# Patient Record
Sex: FEMALE | Race: WHITE | Employment: OTHER | URBAN - METROPOLITAN AREA
[De-identification: names, ages, dates, MRNs, and addresses within clinical notes are randomized per-mention and may not be internally consistent; named-entity substitution may affect disease eponyms.]

---

## 2019-02-06 ENCOUNTER — HOSPITAL ENCOUNTER (OUTPATIENT)
Dept: GENERAL RADIOLOGY | Age: 54
Discharge: HOME | End: 2019-02-08
Payer: COMMERCIAL

## 2019-02-06 DIAGNOSIS — M25.562 LEFT KNEE PAIN, UNSPECIFIED CHRONICITY: ICD-10-CM

## 2019-02-06 PROCEDURE — 73564 X-RAY EXAM KNEE 4 OR MORE: CPT

## 2019-04-08 NOTE — H&P
Normoactive bowel sounds x4  quadrants. EXTREMITIES:  Left knee:  Positive Omer. Current range of motion is  0 to 100 degrees. NEURO:  CN II through XII grossly intact. ASSESSMENT:  Left knee medial meniscal tear. PLAN:  Left knee arthroscopy, partial medial meniscectomy. This will be  on 04/12/2019.       Terra Celestin    D: 04/08/2019 13:02:31       T: 04/08/2019 13:25:10     [email or web address]  Job#: [id number]     Doc#: [id number]    CC:

## 2019-04-11 ENCOUNTER — ANESTHESIA EVENT (OUTPATIENT)
Dept: OPERATING ROOM | Age: 54
End: 2019-04-11
Payer: COMMERCIAL

## 2019-04-11 NOTE — ANESTHESIA PRE PROCEDURE
Department of Anesthesiology  Preprocedure Note       Name:  Aidan Cazares   Age:  48 y.o.  :  1965                                          MRN:  [id number]         Date:  2019      Surgeon: Damian Hogue):  Allison Ha MD    Procedure: LEFT KNEE ARTHROSCOPY KNEE MEDIAL MENISCECTOMY, SUPINE, (OUTSIDE PAT AT DR Eun Roberson) TO BE LATEX FREE (Left Knee)    Medications prior to admission:   Prior to Admission medications    Medication Sig Start Date End Date Taking? Authorizing Provider   risperiDONE (RISPERDAL) 0.25 MG tablet Take 0.25 mg by mouth 2 times daily   Yes Historical Provider, MD   clonazepam (KLONOPIN) Take 0.005 mg/kg by mouth 3 times daily. Yes Historical Provider, MD   traMADol (ULTRAM) 50 MG tablet Take 50 mg by mouth every 6 hours as needed for Pain. Yes Historical Provider, MD   Dulaglutide (TRULICITY) 6.61 DR/7.5OG SOPN Inject into the skin   Yes Historical Provider, MD   albuterol (ACCUNEB) 0.63 MG/3ML nebulizer solution Take 1 ampule by nebulization every 6 hours as needed for Wheezing   Yes Historical Provider, MD   ALBUTEROL IN Inhale into the lungs   Yes Historical Provider, MD       Current medications:    Current Facility-Administered Medications   Medication Dose Route Frequency Provider Last Rate Last Dose    ceFAZolin (ANCEF) 2 g in dextrose 3 % 50 mL IVPB (duplex)  2 g Intravenous On Call to [address] Larnell Homans, MD        lactated ringers infusion   Intravenous Continuous Ami Plan, APRN - CNP        sodium chloride flush 0.9 % injection 10 mL  10 mL Intravenous 2 times per day Ami Plan, APRN - CNP        sodium chloride flush 0.9 % injection 10 mL  10 mL Intravenous PRN Ami Plan, APRN - CNP        lidocaine PF 1 % injection 1 mL  1 mL Intradermal Once PRN Ami Plan, APRN - CNP        lactated ringers infusion                Allergies:     Allergies   Allergen Reactions    Moxifloxacin Rash       Problem List:  There is no problem list on file for this patient. Past Medical History:        Diagnosis Date    Arthritis     CAD (coronary artery disease)     COPD (chronic obstructive pulmonary disease) [facility] [address]     Diabetes mellitus ([facility]     History of blood transfusion        Past Surgical History:        Procedure Laterality Date     SECTION      COLONOSCOPY      KNEE SURGERY         Social History:    Social History     Tobacco Use    Smoking status: Current Every Day Smoker     Packs/day: 1.00    Smokeless tobacco: Never Used    Tobacco comment: is stopping today   Substance Use Topics    Alcohol use: Not Currently                                Ready to quit: Not Answered  Counseling given: Not Answered  Comment: is stopping today      Vital Signs (Current):   Vitals:    19 0624   BP: 87/72   Pulse: 81   Resp: 16   Temp: 97.4 °F (36.3 °C)   TempSrc: Temporal   SpO2: 96%   Weight: 260 lb (117.9 kg)   Height: 5' 9\" (1.753 m)                                              BP Readings from Last 3 Encounters:   19 87/72       NPO Status: Time of last liquid consumption:                         Time of last solid consumption:                         Date of last liquid consumption: 19                        Date of last solid food consumption: 19    BMI:   Wt Readings from Last 3 Encounters:   19 260 lb (117.9 kg)     Body mass index is 38.4 kg/m².     CBC:   Lab Results   Component Value Date    WBC 7.8 2015    RBC 4.61 2015    HGB 15.4 2015    HCT 44.9 2015    MCV 97.3 2015    RDW 12.9 2015     2015       CMP:   Lab Results   Component Value Date     2015    K 4.2 2015    CL 98 2015    CO2 26 2015    BUN 19 2015    CREATININE 0.99 2015    GFRAA >60.0 2015    LABGLOM 59.9 2015    GLUCOSE 99 2015    PROT 6.6 2015    CALCIUM 9.1 2015    BILITOT 0.2 2015    ALKPHOS 78 05/04/2015    AST 20 05/04/2015    ALT 27 05/04/2015       POC Tests: No results for input(s): POCGLU, POCNA, POCK, POCCL, POCBUN, POCHEMO, POCHCT in the last 72 hours. Coags:   Lab Results   Component Value Date    PROTIME 9.7 05/04/2015    INR 1.0 05/04/2015    APTT 26.8 05/04/2015       HCG (If Applicable):   Lab Results   Component Value Date    PREGTESTUR Negative 04/19/2016        ABGs: No results found for: PHART, PO2ART, DGN2HPP, VDI8MJY, BEART, Q5BLHOTQ     Type & Screen (If Applicable):  No results found for: LABABO, [address]    Anesthesia Evaluation  Patient summary reviewed and Nursing notes reviewed no history of anesthetic complications:   Airway: Mallampati: II  TM distance: >3 FB   Neck ROM: full  Mouth opening: > = 3 FB Dental: normal exam         Pulmonary:normal exam  breath sounds clear to auscultation  (+) COPD:  current smoker          Patient did not smoke on day of surgery. Cardiovascular:  Exercise tolerance: good (>4 METS),   (+) CAD:,       ECG reviewed  Rhythm: regular  Rate: normal           Beta Blocker:  Not on Beta Blocker         Neuro/Psych:   (+) psychiatric history:depression/anxiety             GI/Hepatic/Renal: Neg GI/Hepatic/Renal ROS            Endo/Other: Negative Endo/Other ROS   (+) Diabetes, . Pt had PAT visit. Abdominal:           Vascular: negative vascular ROS. Anesthesia Plan      general     ASA 3     (LMA)  Induction: intravenous. MIPS: Postoperative opioids intended and Prophylactic antiemetics administered. Anesthetic plan and risks discussed with patient. Plan discussed with CRNA.     Attending anesthesiologist reviewed and agrees with Peterson Herrera DO   4/12/2019

## 2019-04-12 ENCOUNTER — ANESTHESIA (OUTPATIENT)
Dept: OPERATING ROOM | Age: 54
End: 2019-04-12
Payer: COMMERCIAL

## 2019-04-12 ENCOUNTER — HOSPITAL ENCOUNTER (OUTPATIENT)
Age: 54
Setting detail: OUTPATIENT SURGERY
Discharge: HOME | End: 2019-04-12
Attending: ORTHOPAEDIC SURGERY | Admitting: ORTHOPAEDIC SURGERY
Payer: COMMERCIAL

## 2019-04-12 VITALS
HEIGHT: 69 IN | RESPIRATION RATE: 16 BRPM | DIASTOLIC BLOOD PRESSURE: 73 MMHG | HEART RATE: 80 BPM | TEMPERATURE: 97 F | BODY MASS INDEX: 38.51 KG/M2 | SYSTOLIC BLOOD PRESSURE: 137 MMHG | WEIGHT: 260 LBS | OXYGEN SATURATION: 93 %

## 2019-04-12 VITALS — SYSTOLIC BLOOD PRESSURE: 85 MMHG | TEMPERATURE: 96.6 F | OXYGEN SATURATION: 82 % | DIASTOLIC BLOOD PRESSURE: 53 MMHG

## 2019-04-12 LAB
GLUCOSE BLD-MCNC: 120 MG/DL (ref 60–115)
GLUCOSE BLD-MCNC: 122 MG/DL (ref 60–115)
PERFORMED ON: ABNORMAL
PERFORMED ON: ABNORMAL

## 2019-04-12 PROCEDURE — 3700000000 HC ANESTHESIA ATTENDED CARE: Performed by: ORTHOPAEDIC SURGERY

## 2019-04-12 PROCEDURE — 2580000003 HC RX 258: Performed by: ORTHOPAEDIC SURGERY

## 2019-04-12 PROCEDURE — 7100000011 HC PHASE II RECOVERY - ADDTL 15 MIN: Performed by: ORTHOPAEDIC SURGERY

## 2019-04-12 PROCEDURE — 2709999900 HC NON-CHARGEABLE SUPPLY: Performed by: ORTHOPAEDIC SURGERY

## 2019-04-12 PROCEDURE — 3600000003 HC SURGERY LEVEL 3 BASE: Performed by: ORTHOPAEDIC SURGERY

## 2019-04-12 PROCEDURE — 3600000013 HC SURGERY LEVEL 3 ADDTL 15MIN: Performed by: ORTHOPAEDIC SURGERY

## 2019-04-12 PROCEDURE — 6360000002 HC RX W HCPCS: Performed by: STUDENT IN AN ORGANIZED HEALTH CARE EDUCATION/TRAINING PROGRAM

## 2019-04-12 PROCEDURE — 2580000003 HC RX 258

## 2019-04-12 PROCEDURE — 2500000003 HC RX 250 WO HCPCS: Performed by: ORTHOPAEDIC SURGERY

## 2019-04-12 PROCEDURE — 7100000010 HC PHASE II RECOVERY - FIRST 15 MIN: Performed by: ORTHOPAEDIC SURGERY

## 2019-04-12 PROCEDURE — 7100000001 HC PACU RECOVERY - ADDTL 15 MIN: Performed by: ORTHOPAEDIC SURGERY

## 2019-04-12 PROCEDURE — 7100000000 HC PACU RECOVERY - FIRST 15 MIN: Performed by: ORTHOPAEDIC SURGERY

## 2019-04-12 PROCEDURE — 6360000002 HC RX W HCPCS: Performed by: ORTHOPAEDIC SURGERY

## 2019-04-12 PROCEDURE — 3700000001 HC ADD 15 MINUTES (ANESTHESIA): Performed by: ORTHOPAEDIC SURGERY

## 2019-04-12 PROCEDURE — 6360000002 HC RX W HCPCS: Performed by: NURSE ANESTHETIST, CERTIFIED REGISTERED

## 2019-04-12 RX ORDER — TRAMADOL HYDROCHLORIDE 50 MG/1
50 TABLET ORAL EVERY 6 HOURS PRN
COMMUNITY

## 2019-04-12 RX ORDER — SODIUM CHLORIDE, SODIUM LACTATE, POTASSIUM CHLORIDE, CALCIUM CHLORIDE 600; 310; 30; 20 MG/100ML; MG/100ML; MG/100ML; MG/100ML
INJECTION, SOLUTION INTRAVENOUS
Status: COMPLETED
Start: 2019-04-12 | End: 2019-04-12

## 2019-04-12 RX ORDER — ONDANSETRON 2 MG/ML
4 INJECTION INTRAMUSCULAR; INTRAVENOUS EVERY 6 HOURS PRN
Status: DISCONTINUED | OUTPATIENT
Start: 2019-04-12 | End: 2019-04-12 | Stop reason: HOSPADM

## 2019-04-12 RX ORDER — MORPHINE SULFATE 4 MG/ML
4 INJECTION, SOLUTION INTRAMUSCULAR; INTRAVENOUS
Status: DISCONTINUED | OUTPATIENT
Start: 2019-04-12 | End: 2019-04-12 | Stop reason: HOSPADM

## 2019-04-12 RX ORDER — SODIUM CHLORIDE, SODIUM LACTATE, POTASSIUM CHLORIDE, CALCIUM CHLORIDE 600; 310; 30; 20 MG/100ML; MG/100ML; MG/100ML; MG/100ML
INJECTION, SOLUTION INTRAVENOUS CONTINUOUS
Status: DISCONTINUED | OUTPATIENT
Start: 2019-04-12 | End: 2019-04-12 | Stop reason: HOSPADM

## 2019-04-12 RX ORDER — ONDANSETRON 2 MG/ML
INJECTION INTRAMUSCULAR; INTRAVENOUS PRN
Status: DISCONTINUED | OUTPATIENT
Start: 2019-04-12 | End: 2019-04-12 | Stop reason: SDUPTHER

## 2019-04-12 RX ORDER — HYDROCODONE BITARTRATE AND ACETAMINOPHEN 5; 325 MG/1; MG/1
1 TABLET ORAL PRN
Status: DISCONTINUED | OUTPATIENT
Start: 2019-04-12 | End: 2019-04-12 | Stop reason: HOSPADM

## 2019-04-12 RX ORDER — PROPOFOL 10 MG/ML
INJECTION, EMULSION INTRAVENOUS PRN
Status: DISCONTINUED | OUTPATIENT
Start: 2019-04-12 | End: 2019-04-12 | Stop reason: SDUPTHER

## 2019-04-12 RX ORDER — HYDROMORPHONE HCL 110MG/55ML
0.5 PATIENT CONTROLLED ANALGESIA SYRINGE INTRAVENOUS EVERY 10 MIN PRN
Status: DISCONTINUED | OUTPATIENT
Start: 2019-04-12 | End: 2019-04-12 | Stop reason: HOSPADM

## 2019-04-12 RX ORDER — SODIUM CHLORIDE 0.9 % (FLUSH) 0.9 %
10 SYRINGE (ML) INJECTION EVERY 12 HOURS SCHEDULED
Status: DISCONTINUED | OUTPATIENT
Start: 2019-04-12 | End: 2019-04-12 | Stop reason: HOSPADM

## 2019-04-12 RX ORDER — METOCLOPRAMIDE HYDROCHLORIDE 5 MG/ML
10 INJECTION INTRAMUSCULAR; INTRAVENOUS
Status: DISCONTINUED | OUTPATIENT
Start: 2019-04-12 | End: 2019-04-12 | Stop reason: HOSPADM

## 2019-04-12 RX ORDER — LIDOCAINE HYDROCHLORIDE 10 MG/ML
1 INJECTION, SOLUTION EPIDURAL; INFILTRATION; INTRACAUDAL; PERINEURAL
Status: DISCONTINUED | OUTPATIENT
Start: 2019-04-12 | End: 2019-04-12 | Stop reason: HOSPADM

## 2019-04-12 RX ORDER — SODIUM CHLORIDE 0.9 % (FLUSH) 0.9 %
10 SYRINGE (ML) INJECTION PRN
Status: DISCONTINUED | OUTPATIENT
Start: 2019-04-12 | End: 2019-04-12 | Stop reason: HOSPADM

## 2019-04-12 RX ORDER — ONDANSETRON 2 MG/ML
4 INJECTION INTRAMUSCULAR; INTRAVENOUS
Status: DISCONTINUED | OUTPATIENT
Start: 2019-04-12 | End: 2019-04-12 | Stop reason: HOSPADM

## 2019-04-12 RX ORDER — OXYCODONE HYDROCHLORIDE AND ACETAMINOPHEN 5; 325 MG/1; MG/1
2 TABLET ORAL EVERY 4 HOURS PRN
Status: DISCONTINUED | OUTPATIENT
Start: 2019-04-12 | End: 2019-04-12 | Stop reason: HOSPADM

## 2019-04-12 RX ORDER — RISPERIDONE 0.25 MG/1
0.25 TABLET, FILM COATED ORAL 2 TIMES DAILY
COMMUNITY

## 2019-04-12 RX ORDER — HYDROCODONE BITARTRATE AND ACETAMINOPHEN 5; 325 MG/1; MG/1
2 TABLET ORAL PRN
Status: DISCONTINUED | OUTPATIENT
Start: 2019-04-12 | End: 2019-04-12 | Stop reason: HOSPADM

## 2019-04-12 RX ORDER — MAGNESIUM HYDROXIDE 1200 MG/15ML
LIQUID ORAL CONTINUOUS PRN
Status: COMPLETED | OUTPATIENT
Start: 2019-04-12 | End: 2019-04-12

## 2019-04-12 RX ORDER — MIDAZOLAM HYDROCHLORIDE 1 MG/ML
INJECTION INTRAMUSCULAR; INTRAVENOUS PRN
Status: DISCONTINUED | OUTPATIENT
Start: 2019-04-12 | End: 2019-04-12 | Stop reason: SDUPTHER

## 2019-04-12 RX ORDER — DOCUSATE SODIUM 100 MG/1
100 CAPSULE, LIQUID FILLED ORAL 2 TIMES DAILY
Status: DISCONTINUED | OUTPATIENT
Start: 2019-04-12 | End: 2019-04-12 | Stop reason: HOSPADM

## 2019-04-12 RX ORDER — OXYCODONE HYDROCHLORIDE AND ACETAMINOPHEN 5; 325 MG/1; MG/1
1 TABLET ORAL EVERY 4 HOURS PRN
Status: DISCONTINUED | OUTPATIENT
Start: 2019-04-12 | End: 2019-04-12 | Stop reason: HOSPADM

## 2019-04-12 RX ORDER — ALBUTEROL SULFATE 0.63 MG/3ML
1 SOLUTION RESPIRATORY (INHALATION) EVERY 6 HOURS PRN
COMMUNITY

## 2019-04-12 RX ORDER — BUPIVACAINE HYDROCHLORIDE 5 MG/ML
INJECTION, SOLUTION EPIDURAL; INTRACAUDAL PRN
Status: DISCONTINUED | OUTPATIENT
Start: 2019-04-12 | End: 2019-04-12 | Stop reason: ALTCHOICE

## 2019-04-12 RX ORDER — DIPHENHYDRAMINE HYDROCHLORIDE 50 MG/ML
12.5 INJECTION INTRAMUSCULAR; INTRAVENOUS
Status: DISCONTINUED | OUTPATIENT
Start: 2019-04-12 | End: 2019-04-12 | Stop reason: HOSPADM

## 2019-04-12 RX ORDER — FENTANYL CITRATE 50 UG/ML
50 INJECTION, SOLUTION INTRAMUSCULAR; INTRAVENOUS EVERY 10 MIN PRN
Status: DISCONTINUED | OUTPATIENT
Start: 2019-04-12 | End: 2019-04-12 | Stop reason: HOSPADM

## 2019-04-12 RX ORDER — CEFAZOLIN SODIUM 2 G/50ML
2 SOLUTION INTRAVENOUS
Status: COMPLETED | OUTPATIENT
Start: 2019-04-12 | End: 2019-04-12

## 2019-04-12 RX ORDER — MORPHINE SULFATE 2 MG/ML
2 INJECTION, SOLUTION INTRAMUSCULAR; INTRAVENOUS
Status: DISCONTINUED | OUTPATIENT
Start: 2019-04-12 | End: 2019-04-12 | Stop reason: HOSPADM

## 2019-04-12 RX ORDER — MEPERIDINE HYDROCHLORIDE 50 MG/ML
12.5 INJECTION INTRAMUSCULAR; INTRAVENOUS; SUBCUTANEOUS EVERY 5 MIN PRN
Status: DISCONTINUED | OUTPATIENT
Start: 2019-04-12 | End: 2019-04-12 | Stop reason: HOSPADM

## 2019-04-12 RX ORDER — DEXAMETHASONE SODIUM PHOSPHATE 10 MG/ML
INJECTION INTRAMUSCULAR; INTRAVENOUS PRN
Status: DISCONTINUED | OUTPATIENT
Start: 2019-04-12 | End: 2019-04-12 | Stop reason: SDUPTHER

## 2019-04-12 RX ADMIN — PROPOFOL 200 MG: 10 INJECTION, EMULSION INTRAVENOUS at 07:30

## 2019-04-12 RX ADMIN — FENTANYL CITRATE 50 MCG: 50 INJECTION, SOLUTION INTRAMUSCULAR; INTRAVENOUS at 07:35

## 2019-04-12 RX ADMIN — SODIUM CHLORIDE, POTASSIUM CHLORIDE, SODIUM LACTATE AND CALCIUM CHLORIDE: 600; 310; 30; 20 INJECTION, SOLUTION INTRAVENOUS at 07:12

## 2019-04-12 RX ADMIN — MIDAZOLAM HYDROCHLORIDE 2 MG: 2 INJECTION, SOLUTION INTRAMUSCULAR; INTRAVENOUS at 07:26

## 2019-04-12 RX ADMIN — CEFAZOLIN SODIUM 2 G: 2 SOLUTION INTRAVENOUS at 07:39

## 2019-04-12 RX ADMIN — ONDANSETRON 4 MG: 2 INJECTION INTRAMUSCULAR; INTRAVENOUS at 07:30

## 2019-04-12 RX ADMIN — SODIUM CHLORIDE, SODIUM LACTATE, POTASSIUM CHLORIDE, CALCIUM CHLORIDE: 600; 310; 30; 20 INJECTION, SOLUTION INTRAVENOUS at 07:12

## 2019-04-12 RX ADMIN — FENTANYL CITRATE 25 MCG: 50 INJECTION, SOLUTION INTRAMUSCULAR; INTRAVENOUS at 07:52

## 2019-04-12 RX ADMIN — FENTANYL CITRATE 25 MCG: 50 INJECTION, SOLUTION INTRAMUSCULAR; INTRAVENOUS at 07:47

## 2019-04-12 RX ADMIN — DEXAMETHASONE SODIUM PHOSPHATE 10 MG: 10 INJECTION INTRAMUSCULAR; INTRAVENOUS at 07:30

## 2019-04-12 ASSESSMENT — PULMONARY FUNCTION TESTS
PIF_VALUE: 13
PIF_VALUE: 1
PIF_VALUE: 19
PIF_VALUE: 13
PIF_VALUE: 4
PIF_VALUE: 4
PIF_VALUE: 6
PIF_VALUE: 3
PIF_VALUE: 18
PIF_VALUE: 3
PIF_VALUE: 4
PIF_VALUE: 3
PIF_VALUE: 17
PIF_VALUE: 3
PIF_VALUE: 1
PIF_VALUE: 3
PIF_VALUE: 4
PIF_VALUE: 21
PIF_VALUE: 3
PIF_VALUE: 2
PIF_VALUE: 3
PIF_VALUE: 3
PIF_VALUE: 5
PIF_VALUE: 4
PIF_VALUE: 3
PIF_VALUE: 3
PIF_VALUE: 1
PIF_VALUE: 3

## 2019-04-12 ASSESSMENT — PAIN DESCRIPTION - LOCATION: LOCATION: KNEE

## 2019-04-12 ASSESSMENT — PAIN DESCRIPTION - DESCRIPTORS: DESCRIPTORS: ACHING

## 2019-04-12 ASSESSMENT — PAIN SCALES - GENERAL: PAINLEVEL_OUTOF10: 3

## 2019-04-12 ASSESSMENT — PAIN - FUNCTIONAL ASSESSMENT: PAIN_FUNCTIONAL_ASSESSMENT: 0-10

## 2019-04-12 ASSESSMENT — PAIN DESCRIPTION - PAIN TYPE: TYPE: SURGICAL PAIN

## 2019-04-12 ASSESSMENT — PAIN DESCRIPTION - ORIENTATION: ORIENTATION: LEFT

## 2019-04-12 ASSESSMENT — PAIN DESCRIPTION - ONSET: ONSET: ON-GOING

## 2019-04-12 ASSESSMENT — LIFESTYLE VARIABLES: SMOKING_STATUS: 1

## 2019-04-12 ASSESSMENT — PAIN DESCRIPTION - FREQUENCY: FREQUENCY: CONTINUOUS

## 2023-10-13 ENCOUNTER — TELEPHONE (OUTPATIENT)
Dept: CARDIOLOGY | Facility: CLINIC | Age: 58
End: 2023-10-13
Payer: COMMERCIAL

## 2023-10-13 DIAGNOSIS — I48.0 PAROXYSMAL A-FIB (MULTI): ICD-10-CM

## 2023-10-13 PROBLEM — Z98.890 H/O MITRAL VALVE REPAIR: Status: ACTIVE | Noted: 2023-10-13

## 2023-10-13 PROBLEM — R06.09 DYSPNEA ON MINIMAL EXERTION: Status: ACTIVE | Noted: 2023-10-13

## 2023-10-13 PROBLEM — F17.200 CURRENT SMOKER: Status: ACTIVE | Noted: 2023-10-13

## 2023-10-13 PROBLEM — J44.9 CHRONIC OBSTRUCTIVE PULMONARY DISEASE (MULTI): Status: ACTIVE | Noted: 2023-10-13

## 2023-10-13 PROBLEM — E78.5 HYPERLIPIDEMIA: Status: ACTIVE | Noted: 2023-10-13

## 2023-10-13 RX ORDER — SIMVASTATIN 40 MG/1
40 TABLET, FILM COATED ORAL NIGHTLY
COMMUNITY

## 2023-10-13 RX ORDER — BUPROPION HYDROCHLORIDE 150 MG/1
150 TABLET, EXTENDED RELEASE ORAL EVERY 12 HOURS
COMMUNITY

## 2023-10-13 RX ORDER — BUTALBITAL, ACETAMINOPHEN AND CAFFEINE 50; 325; 40 MG/1; MG/1; MG/1
1 TABLET ORAL EVERY 4 HOURS PRN
COMMUNITY

## 2023-10-13 RX ORDER — PAROXETINE HYDROCHLORIDE 40 MG/1
40 TABLET, FILM COATED ORAL DAILY
COMMUNITY

## 2023-10-13 RX ORDER — DIPHENHYDRAMINE HCL 25 MG
25 TABLET ORAL NIGHTLY PRN
COMMUNITY

## 2023-10-13 RX ORDER — DIGOXIN 125 MCG
125 TABLET ORAL DAILY
COMMUNITY
End: 2023-12-05 | Stop reason: SDUPTHER

## 2023-10-13 RX ORDER — LAMOTRIGINE 200 MG/1
200 TABLET ORAL DAILY
COMMUNITY

## 2023-10-13 RX ORDER — RISPERIDONE 0.5 MG/1
0.5 TABLET ORAL NIGHTLY
COMMUNITY

## 2023-10-13 RX ORDER — FLECAINIDE ACETATE 100 MG/1
100 TABLET ORAL 3 TIMES DAILY
COMMUNITY
Start: 2022-03-07

## 2023-10-13 RX ORDER — POTASSIUM CHLORIDE 20 MEQ/1
20 TABLET, EXTENDED RELEASE ORAL DAILY
COMMUNITY

## 2023-10-13 RX ORDER — PANTOPRAZOLE SODIUM 40 MG/1
40 TABLET, DELAYED RELEASE ORAL DAILY
COMMUNITY

## 2023-10-13 RX ORDER — OXYBUTYNIN CHLORIDE 5 MG/1
1 TABLET ORAL 2 TIMES DAILY
COMMUNITY

## 2023-10-13 RX ORDER — TOPIRAMATE 25 MG/1
25 TABLET ORAL 2 TIMES DAILY
COMMUNITY

## 2023-10-13 RX ORDER — GABAPENTIN 400 MG/1
400 CAPSULE ORAL 3 TIMES DAILY
COMMUNITY

## 2023-10-13 RX ORDER — RISPERIDONE 1 MG/1
1 TABLET ORAL NIGHTLY
COMMUNITY

## 2023-10-13 RX ORDER — RISPERIDONE 2 MG/1
2 TABLET ORAL 2 TIMES DAILY
COMMUNITY

## 2023-10-13 RX ORDER — GLIPIZIDE 10 MG/1
10 TABLET ORAL DAILY
COMMUNITY

## 2023-10-13 RX ORDER — DULAGLUTIDE 1.5 MG/.5ML
4.5 INJECTION, SOLUTION SUBCUTANEOUS
COMMUNITY

## 2023-10-13 RX ORDER — FAMOTIDINE 40 MG/1
1 TABLET, FILM COATED ORAL NIGHTLY
COMMUNITY

## 2023-10-16 NOTE — TELEPHONE ENCOUNTER
Patient phoned has had episode of syncope [place] [facility] [place] no medication changes were made, states BP has been running in the low 100's Systolic has been having dizzy smells that last for about 1/2 hour not related to activity. Had follow up with PCP suggested to follow up with cardiology, for medication changes.  Medical records requested.     To Dr. Awais Sinclair MD for review  
Spoke with patient who verbalized understanding and sent to scheduling to call and schedule an EKG   
20-Sep-2023 12:05

## 2023-10-17 ENCOUNTER — ANCILLARY PROCEDURE (OUTPATIENT)
Dept: CARDIOLOGY | Facility: CLINIC | Age: 58
End: 2023-10-17
Payer: COMMERCIAL

## 2023-10-17 VITALS
BODY MASS INDEX: 32.88 KG/M2 | SYSTOLIC BLOOD PRESSURE: 84 MMHG | WEIGHT: 222 LBS | HEIGHT: 69 IN | HEART RATE: 88 BPM | DIASTOLIC BLOOD PRESSURE: 60 MMHG

## 2023-10-17 DIAGNOSIS — I48.0 PAROXYSMAL A-FIB (MULTI): ICD-10-CM

## 2023-10-17 PROCEDURE — 93000 ELECTROCARDIOGRAM COMPLETE: CPT | Performed by: INTERNAL MEDICINE

## 2023-10-17 NOTE — PROGRESS NOTES
Patient here for EKG ordered by Dr. Sinclair due to syncope. Dr. Argueta in suite. Patient had a syncopal episode and was taken off her Atenolol 25 mg. She stated that she had dizziness that was all the time and fatigue. EKG reviewed by KENNETH Yun RN. She advised to do orthostatic BP's. Patient had no change in symptoms with positional changes.    To Dr. Awais Sinclair MD

## 2023-10-25 LAB
NON-UH HIE ANION GAP:SCNC:PT:SER/PLAS:QN:: 14 MEQ/L (ref 6–16)
NON-UH HIE CALCIUM:MCNC:PT:SER/PLAS:QN:: 9.1 MG/DL (ref 8.9–11.1)
NON-UH HIE CARBON DIOXIDE:SCNC:PT:SER/PLAS:QN:: 24 MMOL/L (ref 21–31)
NON-UH HIE CHLORIDE:SCNC:PT:SER/PLAS:QN:: 97 MMOL/L (ref 101–111)
NON-UH HIE CHOLESTEROL.IN HDL:MCNC:PT:SER/PLAS:QN:: 31 MG/DL
NON-UH HIE CHOLESTEROL.IN LDL:MCNC:PT:SER/PLAS:QN:: 50 MG/DL
NON-UH HIE CHOLESTEROL.IN VLDL:MCNC:PT:SER/PLAS:QN:CALCULATED: ABNORMAL MG/DL (ref 7–40)
NON-UH HIE CHOLESTEROL:MCNC:PT:SER/PLAS:QN:: 183 MG/DL (ref 120–200)
NON-UH HIE CREATININE:MCNC:PT:SER/PLAS:QN:: 0.9 MG/DL (ref 0.5–1.3)
NON-UH HIE ERYTHROCYTE DISTRIBUTION WIDTH:RATIO:PT:RBC:QN:AUTOMATED COUNT: 14 % (ref 10.9–14.2)
NON-UH HIE ERYTHROCYTE MEAN CORPUSCULAR HEMOGLOBIN CONCENTRATION:MCNC:PT:RB: 34.2 GM/DL (ref 31.4–36)
NON-UH HIE ERYTHROCYTE MEAN CORPUSCULAR HEMOGLOBIN:ENTMASS:PT:RBC:QN:AUTOMA: 32.3 PG (ref 27–34)
NON-UH HIE ERYTHROCYTE MEAN CORPUSCULAR VOLUME:ENTVOL:PT:RBC:QN:AUTOMATED C: 94.6 FL (ref 80–100)
NON-UH HIE ERYTHROCYTES:NCNC:PT:BLD:QN:AUTOMATED COUNT: 4.5 E12/L (ref 4.3–5.9)
NON-UH HIE GLOMERULAR FILTRATION RATE/1.73 SQ M.PREDICTED.NON BLACK:ARVRAT:: 75 ML/MIN/1.73 M2
NON-UH HIE GLUCOSE:MCNC:PT:BLD:QN:: 200 MG/DL (ref 55–199)
NON-UH HIE HEMATOCRIT:VFR:PT:BLD:QN:AUTOMATED COUNT: 42.3 % (ref 34–46)
NON-UH HIE HEMOGLOBIN:MCNC:PT:BLD:QN:: 14.5 GM/DL (ref 12–16)
NON-UH HIE LEUKOCYTES: 9.6 E9/L (ref 4–11)
NON-UH HIE PLATELET MEAN VOLUME:ENTVOL:PT:BLD:QN:AUTOMATED COUNT: 8.7 FL (ref 6.4–10.8)
NON-UH HIE PLATELETS:NCNC:PT:BLD:QN:AUTOMATED COUNT: 241 E9/L (ref 150–500)
NON-UH HIE POTASSIUM:SCNC:PT:SER/PLAS:QN:: 4.3 MMOL/L (ref 3.5–5.3)
NON-UH HIE SODIUM:SCNC:PT:SER/PLAS:QN:: 131 MMOL/L (ref 135–145)
NON-UH HIE TRIGLYCERIDE:MCNC:PT:SER/PLAS:QN:: 818 MG/DL
NON-UH HIE UREA NITROGEN/CREATININE:MRTO:PT:SER/PLAS:QN:: 11 NO UNITS (ref 10–20)
NON-UH HIE UREA NITROGEN:MCNC:PT:SER/PLAS:QN:: 10 MG/DL (ref 5–21)

## 2023-12-05 DIAGNOSIS — I48.0 PAROXYSMAL A-FIB (MULTI): ICD-10-CM

## 2023-12-06 RX ORDER — DIGOXIN 125 MCG
125 TABLET ORAL DAILY
Qty: 90 TABLET | Refills: 3 | Status: SHIPPED | OUTPATIENT
Start: 2023-12-06

## 2024-05-30 DIAGNOSIS — I48.0 PAROXYSMAL A-FIB (MULTI): ICD-10-CM

## 2024-05-30 RX ORDER — DILTIAZEM HYDROCHLORIDE 60 MG/1
60 TABLET, FILM COATED ORAL 2 TIMES DAILY
Qty: 180 TABLET | Refills: 1 | Status: SHIPPED | OUTPATIENT
Start: 2024-05-30 | End: 2025-05-30

## 2024-05-30 NOTE — TELEPHONE ENCOUNTER
Patient reports went to [facility] [place] [facility] er last night for migraine/headache but was told in AFIB. Patient was given IV diltiazem and HR slowed and converted. Patient was sent home. Since her heart rate has increased again. Vitals this morning:    /81      Patient is asymptomatic at this time. Patient advised to go to the ER if  symptoms occur, verbalized understanding     Records are in care everywhere under bulletn. documents.     To Dr. Awais Sinclair MD

## 2024-05-30 NOTE — TELEPHONE ENCOUNTER
Patient advised, verbalized understanding. Patient needs rx to [facility] in [place]. Patient reports she will call with any symptom changes or low BP readings.

## 2024-08-05 DIAGNOSIS — I48.0 PAROXYSMAL A-FIB (MULTI): ICD-10-CM

## 2024-08-06 RX ORDER — FLECAINIDE ACETATE 100 MG/1
100 TABLET ORAL 3 TIMES DAILY
Qty: 270 TABLET | Refills: 3 | Status: SHIPPED | OUTPATIENT
Start: 2024-08-06

## 2024-08-20 DIAGNOSIS — I48.0 PAROXYSMAL A-FIB (MULTI): ICD-10-CM

## 2024-08-28 ENCOUNTER — APPOINTMENT (OUTPATIENT)
Dept: CARDIOLOGY | Facility: CLINIC | Age: 59
End: 2024-08-28
Payer: COMMERCIAL

## 2024-09-19 ENCOUNTER — APPOINTMENT (OUTPATIENT)
Dept: CARDIOLOGY | Facility: CLINIC | Age: 59
End: 2024-09-19
Payer: COMMERCIAL

## 2024-09-24 ENCOUNTER — APPOINTMENT (OUTPATIENT)
Dept: CARDIOLOGY | Facility: CLINIC | Age: 59
End: 2024-09-24
Payer: COMMERCIAL

## 2024-09-25 ENCOUNTER — APPOINTMENT (OUTPATIENT)
Dept: CARDIOLOGY | Facility: CLINIC | Age: 59
End: 2024-09-25
Payer: COMMERCIAL

## 2024-09-29 LAB
NON-UH HIE ANION GAP:SCNC:PT:SER/PLAS:QN:: 17 MEQ/L (ref 6–16)
NON-UH HIE BASOPHILS/LEUKOCYTES:NFR.DF:PT:BLD:QN:AUTOMATED COUNT: 0.2 E9/L (ref 0–0.2)
NON-UH HIE BASOPHILS:NCNC:PT:BLD:QN:AUTOMATED COUNT: 1.8 % (ref 0–2)
NON-UH HIE CALCIUM:MCNC:PT:SER/PLAS:QN:: 12 MG/DL (ref 8.9–11.1)
NON-UH HIE CARBON DIOXIDE:SCNC:PT:SER/PLAS:QN:: 28 MMOL/L (ref 21–31)
NON-UH HIE CHLORIDE:SCNC:PT:SER/PLAS:QN:: 91 MMOL/L (ref 101–111)
NON-UH HIE COAGULATION SURFACE INDUCED:TIME:PT:PPP:QN:COAG: 33.3 SECOND(S) (ref 25.1–36.5)
NON-UH HIE COAGULATION TISSUE FACTOR INDUCED.INR:RELTIME:PT:PPP:QN:COAG: 1.08
NON-UH HIE COAGULATION TISSUE FACTOR INDUCED:TIME:PT:PPP:QN:COAG: 12.1 SECOND(S) (ref 9.4–12.5)
NON-UH HIE CREATININE:MCNC:PT:SER/PLAS:QN:: 1.2 MG/DL (ref 0.5–1.3)
NON-UH HIE EGFR: 53 ML/MIN/1.73 M2
NON-UH HIE EOSINOPHILS/100 LEUKOCYTES:NFR:PT:BLD:QN:AUTOMATED COUNT: 0 % (ref 0–8)
NON-UH HIE EOSINOPHILS:NCNC:PT:BLD:QN:: 0 E9/L (ref 0–0.5)
NON-UH HIE ERYTHROCYTE DISTRIBUTION WIDTH:RATIO:PT:RBC:QN:AUTOMATED COUNT: 15.7 % (ref 10.9–14.2)
NON-UH HIE ERYTHROCYTE MEAN CORPUSCULAR HEMOGLOBIN CONCENTRATION:MCNC:PT:RB: 33.2 GM/DL (ref 31.4–36)
NON-UH HIE ERYTHROCYTE MEAN CORPUSCULAR HEMOGLOBIN:ENTMASS:PT:RBC:QN:AUTOMA: 30.1 PG (ref 27–34)
NON-UH HIE ERYTHROCYTE MEAN CORPUSCULAR VOLUME:ENTVOL:PT:RBC:QN:AUTOMATED C: 90.8 FL (ref 80–100)
NON-UH HIE ERYTHROCYTES:NCNC:PT:BLD:QN:AUTOMATED COUNT: 5.5 E12/L (ref 4.3–5.9)
NON-UH HIE GLUCOSE:MCNC:PT:SER/PLAS:QN:: 265 MG/DL (ref 55–199)
NON-UH HIE HEMATOCRIT:VFR:PT:BLD:QN:AUTOMATED COUNT: 49.8 % (ref 34–46)
NON-UH HIE HEMOGLOBIN:MCNC:PT:BLD:QN:: 16.5 GM/DL (ref 12–16)
NON-UH HIE LEUKOCYTES: 10.7 E9/L (ref 4–11)
NON-UH HIE LYMPHOCYTES:NCNC:PT:BLD:QN:: 0.7 E9/L (ref 1–4)
NON-UH HIE LYMPHOCYTES:NCNC:PT:BLD:QN:AUTOMATED COUNT: 7 % (ref 14–50)
NON-UH HIE MAGNESIUM:MCNC:PT:SER/PLAS:QN:: 1.6 MG/DL (ref 1.3–2.4)
NON-UH HIE MONOCYTES:NCNC:PT:BLD:QN:AUTOMATED COUNT: 0.3 E9/L (ref 0.2–1)
NON-UH HIE NEUTROPHILS/100 LEUKOCYTES:NFR:PT:BLD:QN:: 88.1 % (ref 36–75)
NON-UH HIE NEUTROPHILS:NCNC:PT:BLD:QN:AUTOMATED COUNT: 9.5 E9/L (ref 2–7.5)
NON-UH HIE PLATELET MEAN VOLUME:ENTVOL:PT:BLD:QN:AUTOMATED COUNT: 7.7 FL (ref 6.4–10.8)
NON-UH HIE PLATELETS:NCNC:PT:BLD:QN:AUTOMATED COUNT: 280 E9/L (ref 150–500)
NON-UH HIE POTASSIUM:SCNC:PT:SER/PLAS:QN:: 4.3 MMOL/L (ref 3.5–5.3)
NON-UH HIE SODIUM:SCNC:PT:SER/PLAS:QN:: 132 MMOL/L (ref 135–145)
NON-UH HIE TROPONIN: 12.1 PG/ML (ref 10.1–27.1)
NON-UH HIE TROPONIN: 12.9 PG/ML (ref 10.1–27.1)
NON-UH HIE UREA NITROGEN/CREATININE:MRTO:PT:SER/PLAS:QN:: 21 NO UNITS (ref 10–20)
NON-UH HIE UREA NITROGEN:MCNC:PT:SER/PLAS:QN:: 25 MG/DL (ref 5–21)

## 2024-10-01 ENCOUNTER — OFFICE VISIT (OUTPATIENT)
Dept: CARDIOLOGY | Facility: CLINIC | Age: 59
End: 2024-10-01
Payer: COMMERCIAL

## 2024-10-01 VITALS
SYSTOLIC BLOOD PRESSURE: 80 MMHG | HEIGHT: 69 IN | BODY MASS INDEX: 29.33 KG/M2 | WEIGHT: 198 LBS | HEART RATE: 151 BPM | DIASTOLIC BLOOD PRESSURE: 44 MMHG

## 2024-10-01 DIAGNOSIS — J44.9 CHRONIC OBSTRUCTIVE PULMONARY DISEASE, UNSPECIFIED COPD TYPE (MULTI): ICD-10-CM

## 2024-10-01 DIAGNOSIS — F17.200 CURRENT SMOKER: ICD-10-CM

## 2024-10-01 DIAGNOSIS — Z98.890 H/O MITRAL VALVE REPAIR: ICD-10-CM

## 2024-10-01 DIAGNOSIS — I48.19 PERSISTENT ATRIAL FIBRILLATION (MULTI): Primary | ICD-10-CM

## 2024-10-01 DIAGNOSIS — E78.2 MIXED HYPERLIPIDEMIA: ICD-10-CM

## 2024-10-01 DIAGNOSIS — R06.09 DYSPNEA ON MINIMAL EXERTION: ICD-10-CM

## 2024-10-01 PROBLEM — I48.0 PAROXYSMAL A-FIB (MULTI): Status: RESOLVED | Noted: 2023-10-13 | Resolved: 2024-10-01

## 2024-10-01 PROBLEM — Z79.01 ANTICOAGULATED: Status: ACTIVE | Noted: 2024-10-01

## 2024-10-01 PROBLEM — I27.20 PULMONARY HYPERTENSION (MULTI): Status: ACTIVE | Noted: 2024-10-01

## 2024-10-01 PROCEDURE — 3008F BODY MASS INDEX DOCD: CPT | Performed by: INTERNAL MEDICINE

## 2024-10-01 PROCEDURE — 99214 OFFICE O/P EST MOD 30 MIN: CPT | Performed by: INTERNAL MEDICINE

## 2024-10-01 PROCEDURE — 4004F PT TOBACCO SCREEN RCVD TLK: CPT | Performed by: INTERNAL MEDICINE

## 2024-10-01 PROCEDURE — 93000 ELECTROCARDIOGRAM COMPLETE: CPT | Performed by: INTERNAL MEDICINE

## 2024-10-01 RX ORDER — GABAPENTIN 600 MG/1
600 TABLET ORAL 3 TIMES DAILY
COMMUNITY

## 2024-10-01 RX ORDER — SEMAGLUTIDE 2.68 MG/ML
INJECTION, SOLUTION SUBCUTANEOUS WEEKLY
COMMUNITY
Start: 2024-09-16

## 2024-10-01 ASSESSMENT — ENCOUNTER SYMPTOMS
SHORTNESS OF BREATH: 1
LIGHT-HEADEDNESS: 1

## 2024-10-01 NOTE — PATIENT INSTRUCTIONS
Please bring all medicines, vitamins, and herbal supplements with you when you come to the office.    Prescriptions will not be filled unless you are compliant with your follow up appointments or have a follow up appointment scheduled as per instruction of your physician. Refills should be requested at the time of your visit.     BMI was above normal measurement. Current weight: 89.8 kg (198 lb)  Weight change since last visit (-) denotes wt loss -24 lbs   Weight loss needed to achieve BMI 25: 29.1 Lbs  Weight loss needed to achieve BMI 30: -4.7 Lbs  Provided instructions on dietary changes  Provided instructions on exercise.    Patient being sent to the ER for treatment

## 2024-10-01 NOTE — PROGRESS NOTES
Subjective   Tenisha Nair is a 59 y.o. female       Chief Complaint    Follow-up          HPI   Patient is here for follow-up from recent hospitalization.  She is a former patient of Dr. Sinclair with a history of mitral valve valvuloplasty, persistent atrial fibrillation and long-term anticoagulation.  Patient was recently at [facility] and was seen by Dr. Isaacs.  Apparently at that point of time she was off anticoagulation and was advised to go back on anticoagulation.  Patient reports symptoms of fatigue and tiredness.  She describes some shortness of breath and minimal lightheadedness.  In the office today her heart rate is around 150 and blood pressure is around 80/44 but minimally symptomatic.  Patient came to the office by herself.  She has not had any syncope.    Assessment    1.  Recurrent atrial fibrillation with rapid ventricular rate and borderline hypotension mildly symptomatic  2.  History of mitral valve valvuloplasty  3.  Long-term anticoagulation with Xarelto  4.  Hyperlipidemia  5.  Tobacco use and COPD with chronic shortness of breath  6.  Mild to moderate pulmonary hypertension based on recent echocardiogram    Plan    1.  I discussed with patient that it would be difficult to manage her atrial fibrillation on outpatient basis considering how fast her heart rate and her borderline low blood pressure.  We discussed sending her to the hospital by squad but the patient reports she has been managing without any issues over the last 5 days and she would like to go on her own discretion.  I did discuss the case over the phone with the ER staff  2.  Will consider switching the patient to sotalol followed by cardioversion  3.  I reviewed with the patient her recent hospitalization record and echocardiogram    Review of Systems   Constitutional: Positive for malaise/fatigue.   Respiratory:  Positive for shortness of breath.    Neurological:  Positive for light-headedness.   All other systems reviewed  "and are negative.           Vitals:    10/01/24 1523   BP: (!) 80/44   BP Location: Left arm   Patient Position: Sitting   Pulse: (!) 151   Weight: 89.8 kg (198 lb)   Height: 1.753 m (5' 9\")      EKG done in office today     Objective   Physical Exam  Constitutional:       Appearance: Normal appearance.   HENT:      Nose: Nose normal.   Neck:      Vascular: No carotid bruit.   Cardiovascular:      Rate and Rhythm: Tachycardia present. Rhythm irregular.      Pulses: Normal pulses.      Heart sounds: Normal heart sounds.   Pulmonary:      Effort: Pulmonary effort is normal.   Abdominal:      General: Bowel sounds are normal.      Palpations: Abdomen is soft.   Musculoskeletal:         General: Normal range of motion.      Cervical back: Normal range of motion.      Right lower leg: No edema.      Left lower leg: No edema.   Skin:     General: Skin is warm and dry.   Neurological:      General: No focal deficit present.      Mental Status: She is alert.   Psychiatric:         Mood and Affect: Mood normal.         Behavior: Behavior normal.         Thought Content: Thought content normal.         Judgment: Judgment normal.         Allergies  Ace inhibitors and Moxifloxacin     Current Medications    Current Outpatient Medications:     buPROPion SR (Wellbutrin SR) 150 mg 12 hr tablet, Take 1 tablet (150 mg) by mouth every 12 hours., Disp: , Rfl:     digoxin (Lanoxin) 125 MCG tablet, Take 1 tablet (125 mcg) by mouth once daily., Disp: 90 tablet, Rfl: 3    dilTIAZem (Cardizem) 60 mg immediate release tablet, Take 1 tablet (60 mg) by mouth 2 times a day., Disp: 180 tablet, Rfl: 1    flecainide (Tambocor) 100 mg tablet, Take 1 tablet (100 mg) by mouth 3 times a day., Disp: 270 tablet, Rfl: 3    gabapentin (Neurontin) 600 mg tablet, Take 1 tablet (600 mg) by mouth 3 times a day., Disp: , Rfl:     glipiZIDE (Glucotrol) 10 mg tablet, Take 1 tablet (10 mg) by mouth once daily., Disp: , Rfl:     lamoTRIgine (LaMICtal) 200 mg " tablet, Take 1 tablet (200 mg) by mouth once daily., Disp: , Rfl:     oxybutynin (Ditropan) 5 mg tablet, Take 1 tablet (5 mg) by mouth 2 times a day., Disp: , Rfl:     Ozempic 2 mg/dose (8 mg/3 mL) pen injector, Inject under the skin 1 (one) time per week., Disp: , Rfl:     PARoxetine (Paxil) 40 mg tablet, Take 1 tablet (40 mg) by mouth once daily., Disp: , Rfl:     risperiDONE (RisperDAL) 2 mg tablet, Take 1 tablet (2 mg) by mouth 2 times a day., Disp: , Rfl:     rivaroxaban (Xarelto) 20 mg tablet, Take 1 tablet (20 mg) by mouth once daily in the evening. Take with meals., Disp: 90 tablet, Rfl: 3    simvastatin (Zocor) 40 mg tablet, Take 1 tablet (40 mg) by mouth once daily at bedtime., Disp: , Rfl:     SITagliptin phosphate (Januvia) 50 mg tablet, Take 1 tablet (50 mg) by mouth once daily., Disp: , Rfl:     theophylline ER (Gino-24) 300 mg 24 hr capsule, Take 1 capsule (300 mg) by mouth once daily., Disp: , Rfl:     topiramate (Topamax) 25 mg tablet, Take 1 tablet (25 mg) by mouth twice a day. Three tablets twice daily, Disp: , Rfl:                      Assessment/Plan   1. Persistent atrial fibrillation (Multi)  Follow Up In Cardiology    ECG 12 Lead      2. Mixed hyperlipidemia        3. H/O mitral valve repair        4. Dyspnea on minimal exertion        5. Chronic obstructive pulmonary disease, unspecified COPD type (Multi)        6. Current smoker        7. BMI 29.0-29.9,adult                 Scribe Attestation  By signing my name below, IPrachi LPN, Scribe   attest that this documentation has been prepared under the direction and in the presence of Anabelle Humphreys MD.     Provider Attestation - Scribe documentation    All medical record entries made by the Scribe were at my direction and personally dictated by me. I have reviewed the chart and agree that the record accurately reflects my personal performance of the history, physical exam, discussion and plan.

## 2024-10-02 ENCOUNTER — TELEPHONE (OUTPATIENT)
Dept: CARDIOLOGY | Facility: CLINIC | Age: 59
End: 2024-10-02
Payer: COMMERCIAL

## 2024-10-02 NOTE — TELEPHONE ENCOUNTER
Spoke to patient . States she was evaluated at [facility] ER. States was given IV medication to slow heart rate and was discharged. Has pending appt with Dr Humphreys 10-.  Requested [facility] er records.  Patient states at times is dizzy. Instructed to report to ER for evaluation. Instructed not to drive self. Verbalized understanding.

## 2024-10-09 LAB
NON-UH HIE ANION GAP:SCNC:PT:SER/PLAS:QN:: 9 MEQ/L (ref 6–16)
NON-UH HIE BASOPHILS/LEUKOCYTES:NFR.DF:PT:BLD:QN:AUTOMATED COUNT: 0 E9/L (ref 0–0.2)
NON-UH HIE BASOPHILS:NCNC:PT:BLD:QN:AUTOMATED COUNT: 0.5 % (ref 0–2)
NON-UH HIE CALCIUM:MCNC:PT:SER/PLAS:QN:: 9.2 MG/DL (ref 8.9–11.1)
NON-UH HIE CARBON DIOXIDE:SCNC:PT:SER/PLAS:QN:: 26 MMOL/L (ref 21–31)
NON-UH HIE CHLORIDE:SCNC:PT:SER/PLAS:QN:: 104 MMOL/L (ref 101–111)
NON-UH HIE CREATININE:MCNC:PT:SER/PLAS:QN:: 0.7 MG/DL (ref 0.5–1.3)
NON-UH HIE EGFR: 101 ML/MIN/1.73 M2
NON-UH HIE EOSINOPHILS/100 LEUKOCYTES:NFR:PT:BLD:QN:AUTOMATED COUNT: 0.7 % (ref 0–8)
NON-UH HIE EOSINOPHILS:NCNC:PT:BLD:QN:: 0.1 E9/L (ref 0–0.5)
NON-UH HIE ERYTHROCYTE DISTRIBUTION WIDTH:RATIO:PT:RBC:QN:AUTOMATED COUNT: 15.5 % (ref 10.9–14.2)
NON-UH HIE ERYTHROCYTE MEAN CORPUSCULAR HEMOGLOBIN CONCENTRATION:MCNC:PT:RB: 32.9 GM/DL (ref 31.4–36)
NON-UH HIE ERYTHROCYTE MEAN CORPUSCULAR HEMOGLOBIN:ENTMASS:PT:RBC:QN:AUTOMA: 30.2 PG (ref 27–34)
NON-UH HIE ERYTHROCYTE MEAN CORPUSCULAR VOLUME:ENTVOL:PT:RBC:QN:AUTOMATED C: 91.7 FL (ref 80–100)
NON-UH HIE ERYTHROCYTES:NCNC:PT:BLD:QN:AUTOMATED COUNT: 4.9 E12/L (ref 4.3–5.9)
NON-UH HIE GLUCOSE:MCNC:PT:SER/PLAS:QN:: 136 MG/DL (ref 55–199)
NON-UH HIE HEMATOCRIT:VFR:PT:BLD:QN:AUTOMATED COUNT: 44.8 % (ref 34–46)
NON-UH HIE HEMOGLOBIN:MCNC:PT:BLD:QN:: 14.7 GM/DL (ref 12–16)
NON-UH HIE LEUKOCYTES: 8.6 E9/L (ref 4–11)
NON-UH HIE LYMPHOCYTES:NCNC:PT:BLD:QN:: 1.2 E9/L (ref 1–4)
NON-UH HIE LYMPHOCYTES:NCNC:PT:BLD:QN:AUTOMATED COUNT: 13.6 % (ref 14–50)
NON-UH HIE MONOCYTES:NCNC:PT:BLD:QN:AUTOMATED COUNT: 0.6 E9/L (ref 0.2–1)
NON-UH HIE NEUTROPHILS/100 LEUKOCYTES:NFR:PT:BLD:QN:: 77.8 % (ref 36–75)
NON-UH HIE NEUTROPHILS:NCNC:PT:BLD:QN:AUTOMATED COUNT: 6.7 E9/L (ref 2–7.5)
NON-UH HIE PLATELET MEAN VOLUME:ENTVOL:PT:BLD:QN:AUTOMATED COUNT: 7.4 FL (ref 6.4–10.8)
NON-UH HIE PLATELETS:NCNC:PT:BLD:QN:AUTOMATED COUNT: 241 E9/L (ref 150–500)
NON-UH HIE POTASSIUM:SCNC:PT:SER/PLAS:QN:: 3.9 MMOL/L (ref 3.5–5.3)
NON-UH HIE SODIUM:SCNC:PT:SER/PLAS:QN:: 135 MMOL/L (ref 135–145)
NON-UH HIE TUBE COLLECTED PLASMA: YES
NON-UH HIE UREA NITROGEN/CREATININE:MRTO:PT:SER/PLAS:QN:: 14 NO UNITS (ref 10–20)
NON-UH HIE UREA NITROGEN:MCNC:PT:SER/PLAS:QN:: 10 MG/DL (ref 5–21)

## 2024-10-14 ENCOUNTER — APPOINTMENT (OUTPATIENT)
Dept: CARDIOLOGY | Facility: CLINIC | Age: 59
End: 2024-10-14
Payer: COMMERCIAL

## 2024-10-14 VITALS
SYSTOLIC BLOOD PRESSURE: 108 MMHG | HEART RATE: 100 BPM | WEIGHT: 194 LBS | HEIGHT: 69 IN | BODY MASS INDEX: 28.73 KG/M2 | DIASTOLIC BLOOD PRESSURE: 68 MMHG

## 2024-10-14 DIAGNOSIS — R06.09 DYSPNEA ON MINIMAL EXERTION: ICD-10-CM

## 2024-10-14 DIAGNOSIS — Z79.01 ANTICOAGULATED: ICD-10-CM

## 2024-10-14 DIAGNOSIS — E78.2 MIXED HYPERLIPIDEMIA: ICD-10-CM

## 2024-10-14 DIAGNOSIS — I48.19 PERSISTENT ATRIAL FIBRILLATION (MULTI): Primary | ICD-10-CM

## 2024-10-14 DIAGNOSIS — Z98.890 H/O MITRAL VALVE REPAIR: ICD-10-CM

## 2024-10-14 DIAGNOSIS — I27.20 PULMONARY HYPERTENSION (MULTI): ICD-10-CM

## 2024-10-14 DIAGNOSIS — J44.9 CHRONIC OBSTRUCTIVE PULMONARY DISEASE, UNSPECIFIED COPD TYPE (MULTI): ICD-10-CM

## 2024-10-14 DIAGNOSIS — F17.200 CURRENT SMOKER: ICD-10-CM

## 2024-10-14 PROCEDURE — 99214 OFFICE O/P EST MOD 30 MIN: CPT | Performed by: INTERNAL MEDICINE

## 2024-10-14 PROCEDURE — 93000 ELECTROCARDIOGRAM COMPLETE: CPT | Performed by: INTERNAL MEDICINE

## 2024-10-14 PROCEDURE — 3008F BODY MASS INDEX DOCD: CPT | Performed by: INTERNAL MEDICINE

## 2024-10-14 PROCEDURE — 4004F PT TOBACCO SCREEN RCVD TLK: CPT | Performed by: INTERNAL MEDICINE

## 2024-10-14 RX ORDER — ATOGEPANT 60 MG/1
60 TABLET ORAL DAILY
COMMUNITY

## 2024-10-14 RX ORDER — TOPIRAMATE 25 MG/1
3 TABLET ORAL 3 TIMES DAILY
COMMUNITY

## 2024-10-14 RX ORDER — ZAVEGEPANT 10 MG/.1ML
SPRAY NASAL
COMMUNITY

## 2024-10-14 NOTE — PROGRESS NOTES
Subjective   Tenisha Nair is a 59 y.o. female       Chief Complaint    Follow-up          HPI   Is here for follow-up and management for recurrent atrial fibrillation, history of mitral valve valvuloplasty and pulmonary hypertension.  Last time I saw her she was in atrial fibrillation with rapid ventricular rate.  We sent her to the emergency room but she was given a dose of IV Cardizem and sent home but then again she presented to the hospital and was admitted with symptomatic atrial fibrillation.  She underwent successful cardioversion.  Echocardiogram showed normal LV systolic function and mild degree of pulmonary hypertension.  Since her cardioversion the patient feels much better.  She denies any cardiac complaint.    Assessment     1.  Persistent atrial fibrillation status post recent cardioversion remain in normal sinus rhythm  2.  History of mitral valve valvuloplasty recent echo noted and reviewed with her  3.  Long-term anticoagulation with Xarelto  4.  Hyperlipidemia  5.  Tobacco use and COPD with chronic shortness of breath  6.  Mild to moderate pulmonary hypertension based on recent echocardiogram  7.  BMI of 28  8.  Mixed hyperlipidemia on simvastatin     Plan     1.  I reviewed with the patient the result of her echocardiogram and recent hospitalization record  2.  I advised her to continue present medical regimen.  If she has any recurrence in the future we might consider sotalol versus ablation  3.  I discussed with her smoking cessation  4.  I will see her back in February 5.  Risk, benefits alternative anticoagulation reviewed with her at length she understood and agreed.  We discussed the data for DOAC's versus Coumadin in patient who have mitral valve disease following lengthy discussion the patient elected to remain on Xarelto  Review of Systems   All other systems reviewed and are negative.           Vitals:    10/14/24 0839   BP: 108/68   BP Location: Left arm   Patient Position: Sitting  "  Pulse: 100   Weight: 88 kg (194 lb)   Height: 1.753 m (5' 9\")    EKG done in office today      Objective   Physical Exam  Constitutional:       Appearance: Normal appearance.   HENT:      Nose: Nose normal.   Neck:      Vascular: No carotid bruit.   Cardiovascular:      Rate and Rhythm: Normal rate.      Pulses: Normal pulses.      Heart sounds: Normal heart sounds.   Pulmonary:      Effort: Pulmonary effort is normal.   Abdominal:      General: Bowel sounds are normal.      Palpations: Abdomen is soft.   Musculoskeletal:         General: Normal range of motion.      Cervical back: Normal range of motion.      Right lower leg: No edema.      Left lower leg: No edema.   Skin:     General: Skin is warm and dry.   Neurological:      General: No focal deficit present.      Mental Status: She is alert.   Psychiatric:         Mood and Affect: Mood normal.         Behavior: Behavior normal.         Thought Content: Thought content normal.         Judgment: Judgment normal.         Allergies  Ace inhibitors and Moxifloxacin     Current Medications    Current Outpatient Medications:     atogepant (Qulipta) 60 mg tablet tablet, Take 1 tablet (60 mg) by mouth once daily., Disp: , Rfl:     buPROPion SR (Wellbutrin SR) 150 mg 12 hr tablet, Take 1 tablet (150 mg) by mouth every 12 hours., Disp: , Rfl:     digoxin (Lanoxin) 125 MCG tablet, Take 1 tablet (125 mcg) by mouth once daily., Disp: 90 tablet, Rfl: 3    dilTIAZem (Cardizem) 60 mg immediate release tablet, Take 1 tablet (60 mg) by mouth 2 times a day., Disp: 180 tablet, Rfl: 1    flecainide (Tambocor) 100 mg tablet, Take 1 tablet (100 mg) by mouth 3 times a day., Disp: 270 tablet, Rfl: 3    gabapentin (Neurontin) 600 mg tablet, Take 1 tablet (600 mg) by mouth 3 times a day., Disp: , Rfl:     glipiZIDE (Glucotrol) 10 mg tablet, Take 1 tablet (10 mg) by mouth once daily., Disp: , Rfl:     lamoTRIgine (LaMICtal) 200 mg tablet, Take 1 tablet (200 mg) by mouth once daily., " Disp: , Rfl:     oxybutynin (Ditropan) 5 mg tablet, Take 1 tablet (5 mg) by mouth 2 times a day., Disp: , Rfl:     Ozempic 2 mg/dose (8 mg/3 mL) pen injector, Inject under the skin 1 (one) time per week., Disp: , Rfl:     PARoxetine (Paxil) 40 mg tablet, Take 1 tablet (40 mg) by mouth once daily., Disp: , Rfl:     risperiDONE (RisperDAL) 2 mg tablet, Take 1 tablet (2 mg) by mouth 2 times a day., Disp: , Rfl:     rivaroxaban (Xarelto) 20 mg tablet, Take 1 tablet (20 mg) by mouth once daily in the evening. Take with meals., Disp: 90 tablet, Rfl: 3    simvastatin (Zocor) 40 mg tablet, Take 1 tablet (40 mg) by mouth once daily at bedtime., Disp: , Rfl:     SITagliptin phosphate (Januvia) 50 mg tablet, Take 1 tablet (50 mg) by mouth once daily., Disp: , Rfl:     theophylline anhydrous (THEOPHYLLINE ORAL), Take 150 mg by mouth once daily., Disp: , Rfl:     topiramate (Topamax) 25 mg tablet, Take 3 tablets (75 mg) by mouth 3 times a day., Disp: , Rfl:     zavegepant (Zavzpret) 10 mg/actuation spray,non-aerosol, Administer into affected nostril(s)., Disp: , Rfl:                      Assessment/Plan   1. Persistent atrial fibrillation (Multi)  ECG 12 Lead      2. Pulmonary hypertension (Multi)        3. H/O mitral valve repair        4. Dyspnea on minimal exertion        5. Anticoagulated        6. Chronic obstructive pulmonary disease, unspecified COPD type (Multi)        7. BMI 29.0-29.9,adult        8. Current smoker        9. Mixed hyperlipidemia                 Scribe Attestation  By signing my name below, I, Nicol Brown LPN   attest that this documentation has been prepared under the direction and in the presence of Anabelle Humphreys MD.     Provider Attestation - Scribe documentation    All medical record entries made by the Scribe were at my direction and personally dictated by me. I have reviewed the chart and agree that the record accurately reflects my personal performance of the history, physical exam,  discussion and plan.

## 2024-10-18 ENCOUNTER — TELEPHONE (OUTPATIENT)
Dept: CARDIOLOGY | Facility: CLINIC | Age: 59
End: 2024-10-18
Payer: COMMERCIAL

## 2024-10-18 NOTE — TELEPHONE ENCOUNTER
[facility] pain management called ( [phone]) for permission to hold Xarelto x 3 days and resume 24 hrs later for for back epidural, no date. S/p cardioversion.     [phone]  To Dr. Anabelle Humphreys MD

## 2024-10-21 LAB
NON-UH HIE ALANINE AMINOTRANSFERASE:CCNC:PT:SER/PLAS:QN:NO ADDITION OF P-5': 14 INT._UNIT/L (ref 6–46)
NON-UH HIE ALBUMIN/GLOBULIN:MCRTO:PT:SER:QN:: 1.4 (ref 1.1–2.2)
NON-UH HIE ALBUMIN:MCNC:PT:SER/PLAS:QN:: 3.8 GM/DL (ref 3.3–5)
NON-UH HIE ALKALINE PHOSPHATASE:CCNC:PT:SER/PLAS:QN:: 97 INT._UNIT/L (ref 21–98)
NON-UH HIE ANION GAP:SCNC:PT:SER/PLAS:QN:: 13 MEQ/L (ref 6–16)
NON-UH HIE ASPARTATE AMINOTRANSFERASE:CCNC:PT:SER/PLAS:QN:: 14 INT._UNIT/L (ref 5–43)
NON-UH HIE BASOPHILS/LEUKOCYTES:NFR.DF:PT:BLD:QN:AUTOMATED COUNT: 0.1 E9/L (ref 0–0.2)
NON-UH HIE BASOPHILS:NCNC:PT:BLD:QN:AUTOMATED COUNT: 0.7 % (ref 0–2)
NON-UH HIE BILIRUBIN.GLUCURONIDATED+BILIRUBIN.ALBUMIN BOUND:MCNC:PT:SER/PLA: 0.1 MG/DL (ref 0–0.4)
NON-UH HIE BILIRUBIN.NON-GLUCURONIDATED:MSCNC:PT:SER/PLAS:QN:: 0.4 MG/DL (ref 0.1–0.9)
NON-UH HIE BILIRUBIN:MCNC:PT:SER/PLAS:QN:: 0.5 MG/DL (ref 0–1.1)
NON-UH HIE CALCIUM:MCNC:PT:SER/PLAS:QN:: 9.5 MG/DL (ref 8.9–11.1)
NON-UH HIE CARBON DIOXIDE:SCNC:PT:SER/PLAS:QN:: 24 MMOL/L (ref 21–31)
NON-UH HIE CHLORIDE:SCNC:PT:SER/PLAS:QN:: 101 MMOL/L (ref 101–111)
NON-UH HIE COAGULATION SURFACE INDUCED:TIME:PT:PPP:QN:COAG: 43.9 SECOND(S) (ref 25.1–36.5)
NON-UH HIE COAGULATION TISSUE FACTOR INDUCED.INR:RELTIME:PT:PPP:QN:COAG: 2.06
NON-UH HIE COAGULATION TISSUE FACTOR INDUCED:TIME:PT:PPP:QN:COAG: 23.2 SECOND(S) (ref 9.4–12.5)
NON-UH HIE CREATININE:MCNC:PT:SER/PLAS:QN:: 1 MG/DL (ref 0.5–1.3)
NON-UH HIE EGFR: 66 ML/MIN/1.73 M2
NON-UH HIE EOSINOPHILS/100 LEUKOCYTES:NFR:PT:BLD:QN:AUTOMATED COUNT: 0.5 % (ref 0–8)
NON-UH HIE EOSINOPHILS:NCNC:PT:BLD:QN:: 0 E9/L (ref 0–0.5)
NON-UH HIE ERYTHROCYTE DISTRIBUTION WIDTH:RATIO:PT:RBC:QN:AUTOMATED COUNT: 15.6 % (ref 10.9–14.2)
NON-UH HIE ERYTHROCYTE MEAN CORPUSCULAR HEMOGLOBIN CONCENTRATION:MCNC:PT:RB: 33.8 GM/DL (ref 31.4–36)
NON-UH HIE ERYTHROCYTE MEAN CORPUSCULAR HEMOGLOBIN:ENTMASS:PT:RBC:QN:AUTOMA: 30.8 PG (ref 27–34)
NON-UH HIE ERYTHROCYTE MEAN CORPUSCULAR VOLUME:ENTVOL:PT:RBC:QN:AUTOMATED C: 91 FL (ref 80–100)
NON-UH HIE ERYTHROCYTES:NCNC:PT:BLD:QN:AUTOMATED COUNT: 4.8 E12/L (ref 4.3–5.9)
NON-UH HIE ETHANOL LVL: <10 MG/DL
NON-UH HIE GLOBULIN:MCNC:PT:SER:QN:CALCULATED: 2.7 GM/DL (ref 1.4–4)
NON-UH HIE GLUCOSE:MCNC:PT:SER/PLAS:QN:: 201 MG/DL (ref 55–199)
NON-UH HIE HEMATOCRIT:VFR:PT:BLD:QN:AUTOMATED COUNT: 44.1 % (ref 34–46)
NON-UH HIE HEMOGLOBIN:MCNC:PT:BLD:QN:: 14.9 GM/DL (ref 12–16)
NON-UH HIE LACTIC ACID LVL: 2.3 MMOL/L (ref 0.5–2.2)
NON-UH HIE LEUKOCYTES: 9.1 E9/L (ref 4–11)
NON-UH HIE LYMPHOCYTES:NCNC:PT:BLD:QN:: 0.7 E9/L (ref 1–4)
NON-UH HIE LYMPHOCYTES:NCNC:PT:BLD:QN:AUTOMATED COUNT: 7.5 % (ref 14–50)
NON-UH HIE MONOCYTES:NCNC:PT:BLD:QN:AUTOMATED COUNT: 0.5 E9/L (ref 0.2–1)
NON-UH HIE NEUTROPHILS/100 LEUKOCYTES:NFR:PT:BLD:QN:: 85.6 % (ref 36–75)
NON-UH HIE NEUTROPHILS:NCNC:PT:BLD:QN:AUTOMATED COUNT: 7.8 E9/L (ref 2–7.5)
NON-UH HIE PLATELET MEAN VOLUME:ENTVOL:PT:BLD:QN:AUTOMATED COUNT: 7.7 FL (ref 6.4–10.8)
NON-UH HIE PLATELET: 207 E9/L (ref 150–500)
NON-UH HIE POTASSIUM:SCNC:PT:SER/PLAS:QN:: 3.9 MMOL/L (ref 3.5–5.3)
NON-UH HIE PROTEIN:MCNC:PT:SER/PLAS:QN:: 6.5 GM/DL (ref 6–7.8)
NON-UH HIE SODIUM:SCNC:PT:SER/PLAS:QN:: 134 MMOL/L (ref 135–145)
NON-UH HIE TRIACYLGLYCEROL LIPASE:CCNC:PT:SER/PLAS:QN:: 14 UNIT/L (ref 13–58)
NON-UH HIE TROPONIN: 11.3 PG/ML (ref 10.1–27.1)
NON-UH HIE UREA NITROGEN/CREATININE:MRTO:PT:SER/PLAS:QN:: 9 NO UNITS (ref 10–20)
NON-UH HIE UREA NITROGEN:MCNC:PT:SER/PLAS:QN:: 9 MG/DL (ref 5–21)

## 2024-11-22 DIAGNOSIS — I48.0 PAROXYSMAL A-FIB (MULTI): ICD-10-CM

## 2024-11-22 RX ORDER — DILTIAZEM HYDROCHLORIDE 60 MG/1
60 TABLET, FILM COATED ORAL 2 TIMES DAILY
Qty: 180 TABLET | Refills: 3 | Status: SHIPPED | OUTPATIENT
Start: 2024-11-22 | End: 2025-11-22

## 2024-12-17 DIAGNOSIS — I48.0 PAROXYSMAL A-FIB (MULTI): ICD-10-CM

## 2024-12-17 RX ORDER — DIGOXIN 125 MCG
125 TABLET ORAL DAILY
Qty: 30 TABLET | Refills: 11 | Status: SHIPPED | OUTPATIENT
Start: 2024-12-17

## 2025-02-12 ENCOUNTER — APPOINTMENT (OUTPATIENT)
Dept: CARDIOLOGY | Facility: CLINIC | Age: 60
End: 2025-02-12
Payer: COMMERCIAL

## 2025-03-10 ENCOUNTER — APPOINTMENT (OUTPATIENT)
Dept: CARDIOLOGY | Facility: CLINIC | Age: 60
End: 2025-03-10
Payer: COMMERCIAL

## 2025-04-10 ENCOUNTER — TELEPHONE (OUTPATIENT)
Dept: CARDIOLOGY | Facility: CLINIC | Age: 60
End: 2025-04-10
Payer: COMMERCIAL

## 2025-04-10 NOTE — TELEPHONE ENCOUNTER
Call from [facility] management requesting approval to hold Xarelto for 3 days, and resume 24 hours, after a transforaminal epidural; patient is not yet scheduled.      Fax approval/denial to [phone]

## 2025-04-16 ENCOUNTER — APPOINTMENT (OUTPATIENT)
Dept: CARDIOLOGY | Facility: CLINIC | Age: 60
End: 2025-04-16
Payer: COMMERCIAL

## 2025-04-16 VITALS
BODY MASS INDEX: 28.44 KG/M2 | HEIGHT: 69 IN | SYSTOLIC BLOOD PRESSURE: 84 MMHG | HEART RATE: 77 BPM | WEIGHT: 192 LBS | DIASTOLIC BLOOD PRESSURE: 56 MMHG

## 2025-04-16 DIAGNOSIS — Z79.01 ANTICOAGULATED: ICD-10-CM

## 2025-04-16 DIAGNOSIS — I95.2 HYPOTENSION DUE TO DRUGS: ICD-10-CM

## 2025-04-16 DIAGNOSIS — J44.9 CHRONIC OBSTRUCTIVE PULMONARY DISEASE, UNSPECIFIED COPD TYPE (MULTI): ICD-10-CM

## 2025-04-16 DIAGNOSIS — E78.2 MIXED HYPERLIPIDEMIA: ICD-10-CM

## 2025-04-16 DIAGNOSIS — Z98.890 H/O MITRAL VALVE REPAIR: ICD-10-CM

## 2025-04-16 DIAGNOSIS — I48.19 PERSISTENT ATRIAL FIBRILLATION (MULTI): Primary | ICD-10-CM

## 2025-04-16 DIAGNOSIS — I27.20 PULMONARY HYPERTENSION (MULTI): ICD-10-CM

## 2025-04-16 PROBLEM — I95.9 HYPOTENSION: Status: ACTIVE | Noted: 2025-04-16

## 2025-04-16 PROCEDURE — 93000 ELECTROCARDIOGRAM COMPLETE: CPT | Performed by: INTERNAL MEDICINE

## 2025-04-16 PROCEDURE — 3008F BODY MASS INDEX DOCD: CPT | Performed by: INTERNAL MEDICINE

## 2025-04-16 PROCEDURE — 99214 OFFICE O/P EST MOD 30 MIN: CPT | Performed by: INTERNAL MEDICINE

## 2025-04-16 RX ORDER — PREGABALIN 300 MG/1
300 CAPSULE ORAL DAILY
COMMUNITY

## 2025-04-16 RX ORDER — PALIPERIDONE 9 MG/1
9 TABLET, EXTENDED RELEASE ORAL DAILY
COMMUNITY
Start: 2025-03-14

## 2025-04-16 RX ORDER — IBUPROFEN 200 MG
1 TABLET ORAL EVERY 24 HOURS
COMMUNITY
Start: 2025-04-09

## 2025-04-16 RX ORDER — ESOMEPRAZOLE MAGNESIUM 40 MG/1
1 CAPSULE, DELAYED RELEASE ORAL
COMMUNITY
Start: 2025-02-13

## 2025-04-16 RX ORDER — SEMAGLUTIDE 1.34 MG/ML
1 INJECTION, SOLUTION SUBCUTANEOUS WEEKLY
COMMUNITY
Start: 2024-04-17

## 2025-04-16 RX ORDER — SOTALOL HYDROCHLORIDE 80 MG/1
80 TABLET ORAL 2 TIMES DAILY
Qty: 180 TABLET | Refills: 3 | Status: SHIPPED | OUTPATIENT
Start: 2025-04-16 | End: 2026-04-16

## 2025-04-16 RX ORDER — TOPIRAMATE 25 MG/1
3 TABLET ORAL 2 TIMES DAILY
COMMUNITY

## 2025-04-16 ASSESSMENT — ENCOUNTER SYMPTOMS
SHORTNESS OF BREATH: 1
SYNCOPE: 1

## 2025-04-16 NOTE — LETTER
April 16, 2025     Latanya Nair    Patient: latanya Nair   YOB: 1965   Date of Visit: 4/16/2025       Dear Dr. Latanya Nair:    Thank you for referring latanya Nair to me for evaluation. Below are my notes for this consultation.  If you have questions, please do not hesitate to call me. I look forward to following your patient along with you.       Sincerely,     Anabelle Humphreys MD      CC: No Recipients  ______________________________________________________________________________________    Chief Complaint   Patient presents with   • Follow-up     Sooner OV per PCP for BP issues       Subjective   Latanya Nair is a 59 y.o. female     HPI     Patient is here for follow-up to management for persistent atrial fibrillation, history of mitral valve valvuloplasty, hyperlipidemia and because of recent complaint of lightheadedness dizziness and low blood pressure.  Reviewing the record indicate the patient was at [facility] and was seen by Dr. Isaacs.  Her antiarrhythmic medication was adjusted from flecainide to sotalol since then her blood pressure has been running on the low range.  She describes symptoms of orthostatic hypotension.  She remained normal sinus rhythm.    Assessment     1.  Symptoms lightheadedness dizziness and orthostatic symptomatology related to low blood pressure since initiating treatment with sotalol  2.  Persistent atrial fibrillation hide her flecainide was switched to sotalol during recent hospitalization  3.  History of mitral valve valvuloplasty recent echo noted and reviewed with her  4.  Long-term anticoagulation with Xarelto  5.  Hyperlipidemia  6.  She said she quit recently.  Tobacco use and COPD with chronic shortness of breath  7.  Mild to moderate pulmonary hypertension based on her last echocardiogram with PA pressure around 43 mmHg  8.  BMI of 28  9.  Mixed hyperlipidemia on simvastatin  10 hypotension due to combination of diltiazem and sotalol     Plan     1.  I  "reviewed with the patient the result of her recent hospitalization record  2.  I advised her to stop diltiazem and to notify me if blood pressure does not improve encouraged her to stay  3.  I encouraged her to stay abstinent from smoking  4.  I will see her back in 4-month  5.  Risk, benefits alternative anticoagulation reviewed with her at length she understood and agreed.  We discussed the data for DOAC's versus Coumadin in patient who have mitral valve disease following lengthy discussion the patient elected to remain on Xarelto  Review of Systems   Constitutional: Positive for malaise/fatigue.   Cardiovascular:  Positive for syncope.   Respiratory:  Positive for shortness of breath.             Vitals:    04/16/25 1443   BP: 84/56   BP Location: Left arm   Patient Position: Sitting   Pulse: 77   Weight: 87.1 kg (192 lb)   Height: 1.753 m (5' 9\")      EKG done in office today    Objective   Physical Exam  Constitutional:       Appearance: Normal appearance.   HENT:      Nose: Nose normal.   Neck:      Vascular: No carotid bruit.   Cardiovascular:      Rate and Rhythm: Normal rate.      Pulses: Normal pulses.      Heart sounds: Normal heart sounds.   Pulmonary:      Effort: Pulmonary effort is normal.   Abdominal:      General: Bowel sounds are normal.      Palpations: Abdomen is soft.   Musculoskeletal:         General: Normal range of motion.      Cervical back: Normal range of motion.      Right lower leg: No edema.      Left lower leg: No edema.   Skin:     General: Skin is warm and dry.   Neurological:      General: No focal deficit present.      Mental Status: She is alert.   Psychiatric:         Mood and Affect: Mood normal.         Behavior: Behavior normal.         Thought Content: Thought content normal.         Judgment: Judgment normal.         Allergies  Ace inhibitors and Moxifloxacin     Current Medications  Current Outpatient Medications   Medication Instructions   • buPROPion SR (WELLBUTRIN SR) " 150 mg, Every 12 hours   • digoxin (LANOXIN) 125 mcg, oral, Daily   • esomeprazole (NexIUM) 40 mg DR capsule 1 capsule, Every 12 hours scheduled (0630,1830)   • lamoTRIgine (LAMICTAL) 200 mg, Daily   • nicotine (Nicoderm CQ) 21 mg/24 hr patch 1 patch, Every 24 hours   • oxybutynin (Ditropan) 5 mg tablet 1 tablet, 2 times daily   • Ozempic 1 mg, Weekly   • paliperidone (INVEGA) 9 mg, Daily   • PARoxetine (PAXIL) 40 mg, Daily   • pregabalin (LYRICA) 300 mg, oral, Daily   • Qulipta 60 mg, Daily   • rivaroxaban (XARELTO) 20 mg, oral, Daily with evening meal   • simvastatin (ZOCOR) 40 mg, Nightly   • SITagliptin phosphate (JANUVIA) 50 mg, Daily   • sotalol (BETAPACE) 80 mg, oral, 2 times daily   • theophylline anhydrous (THEOPHYLLINE ORAL) 150 mg, oral, 2 times daily   • topiramate (Topamax) 25 mg tablet 3 tablets, 2 times daily                        Assessment/Plan   1. Persistent atrial fibrillation (Multi)  sotalol (Betapace) 80 mg tablet    ECG 12 Lead      2. Pulmonary hypertension (Multi)  Follow Up In Cardiology      3. Mixed hyperlipidemia        4. H/O mitral valve repair        5. Anticoagulated        6. BMI 29.0-29.9,adult        7. Chronic obstructive pulmonary disease, unspecified COPD type (Multi)        8. Hypotension due to drugs                 Scribe Attestation  By signing my name below, IGini LPN, Scribe   attest that this documentation has been prepared under the direction and in the presence of Anabelle Humphreys MD.     Provider Attestation - Scribe documentation    All medical record entries made by the Elyibjose were at my direction and personally dictated by me. I have reviewed the chart and agree that the record accurately reflects my personal performance of the history, physical exam, discussion and plan.

## 2025-04-16 NOTE — PROGRESS NOTES
Chief Complaint   Patient presents with    Follow-up     Sooner OV per PCP for BP issues       Subjective   Tenisha aNir is a 59 y.o. female     HPI     Patient is here for follow-up to management for persistent atrial fibrillation, history of mitral valve valvuloplasty, hyperlipidemia and because of recent complaint of lightheadedness dizziness and low blood pressure.  Reviewing the record indicate the patient was at [facility] and was seen by Dr. Isaacs.  Her antiarrhythmic medication was adjusted from flecainide to sotalol since then her blood pressure has been running on the low range.  She describes symptoms of orthostatic hypotension.  She remained normal sinus rhythm.    Assessment     1.  Symptoms lightheadedness dizziness and orthostatic symptomatology related to low blood pressure since initiating treatment with sotalol  2.  Persistent atrial fibrillation hide her flecainide was switched to sotalol during recent hospitalization  3.  History of mitral valve valvuloplasty recent echo noted and reviewed with her  4.  Long-term anticoagulation with Xarelto  5.  Hyperlipidemia  6.  She said she quit recently.  Tobacco use and COPD with chronic shortness of breath  7.  Mild to moderate pulmonary hypertension based on her last echocardiogram with PA pressure around 43 mmHg  8.  BMI of 28  9.  Mixed hyperlipidemia on simvastatin  10 hypotension due to combination of diltiazem and sotalol     Plan     1.  I reviewed with the patient the result of her recent hospitalization record  2.  I advised her to stop diltiazem and to notify me if blood pressure does not improve encouraged her to stay  3.  I encouraged her to stay abstinent from smoking  4.  I will see her back in 4-month  5.  Risk, benefits alternative anticoagulation reviewed with her at length she understood and agreed.  We discussed the data for DOAC's versus Coumadin in patient who have mitral valve disease following lengthy discussion the patient  "elected to remain on Xarelto  Review of Systems   Constitutional: Positive for malaise/fatigue.   Cardiovascular:  Positive for syncope.   Respiratory:  Positive for shortness of breath.             Vitals:    04/16/25 1443   BP: 84/56   BP Location: Left arm   Patient Position: Sitting   Pulse: 77   Weight: 87.1 kg (192 lb)   Height: 1.753 m (5' 9\")      EKG done in office today    Objective   Physical Exam  Constitutional:       Appearance: Normal appearance.   HENT:      Nose: Nose normal.   Neck:      Vascular: No carotid bruit.   Cardiovascular:      Rate and Rhythm: Normal rate.      Pulses: Normal pulses.      Heart sounds: Normal heart sounds.   Pulmonary:      Effort: Pulmonary effort is normal.   Abdominal:      General: Bowel sounds are normal.      Palpations: Abdomen is soft.   Musculoskeletal:         General: Normal range of motion.      Cervical back: Normal range of motion.      Right lower leg: No edema.      Left lower leg: No edema.   Skin:     General: Skin is warm and dry.   Neurological:      General: No focal deficit present.      Mental Status: She is alert.   Psychiatric:         Mood and Affect: Mood normal.         Behavior: Behavior normal.         Thought Content: Thought content normal.         Judgment: Judgment normal.         Allergies  Ace inhibitors and Moxifloxacin     Current Medications  Current Outpatient Medications   Medication Instructions    buPROPion SR (WELLBUTRIN SR) 150 mg, Every 12 hours    digoxin (LANOXIN) 125 mcg, oral, Daily    esomeprazole (NexIUM) 40 mg DR capsule 1 capsule, Every 12 hours scheduled (0630,1830)    lamoTRIgine (LAMICTAL) 200 mg, Daily    nicotine (Nicoderm CQ) 21 mg/24 hr patch 1 patch, Every 24 hours    oxybutynin (Ditropan) 5 mg tablet 1 tablet, 2 times daily    Ozempic 1 mg, Weekly    paliperidone (INVEGA) 9 mg, Daily    PARoxetine (PAXIL) 40 mg, Daily    pregabalin (LYRICA) 300 mg, oral, Daily    Qulipta 60 mg, Daily    rivaroxaban (XARELTO) " 20 mg, oral, Daily with evening meal    simvastatin (ZOCOR) 40 mg, Nightly    SITagliptin phosphate (JANUVIA) 50 mg, Daily    sotalol (BETAPACE) 80 mg, oral, 2 times daily    theophylline anhydrous (THEOPHYLLINE ORAL) 150 mg, oral, 2 times daily    topiramate (Topamax) 25 mg tablet 3 tablets, 2 times daily                        Assessment/Plan   1. Persistent atrial fibrillation (Multi)  sotalol (Betapace) 80 mg tablet    ECG 12 Lead      2. Pulmonary hypertension (Multi)  Follow Up In Cardiology      3. Mixed hyperlipidemia        4. H/O mitral valve repair        5. Anticoagulated        6. BMI 29.0-29.9,adult        7. Chronic obstructive pulmonary disease, unspecified COPD type (Multi)        8. Hypotension due to drugs                 Scribe Attestation  By signing my name below, IGini LPN, Scribe   attest that this documentation has been prepared under the direction and in the presence of Anabelle Humphreys MD.     Provider Attestation - Scribe documentation    All medical record entries made by the Scribe were at my direction and personally dictated by me. I have reviewed the chart and agree that the record accurately reflects my personal performance of the history, physical exam, discussion and plan.

## 2025-07-15 ENCOUNTER — APPOINTMENT (OUTPATIENT)
Dept: CARDIOLOGY | Facility: CLINIC | Age: 60
End: 2025-07-15
Payer: COMMERCIAL

## 2025-07-15 VITALS
WEIGHT: 215 LBS | SYSTOLIC BLOOD PRESSURE: 98 MMHG | HEIGHT: 69 IN | HEART RATE: 85 BPM | BODY MASS INDEX: 31.84 KG/M2 | DIASTOLIC BLOOD PRESSURE: 70 MMHG

## 2025-07-15 DIAGNOSIS — I48.19 PERSISTENT ATRIAL FIBRILLATION (MULTI): Primary | ICD-10-CM

## 2025-07-15 DIAGNOSIS — I27.20 PULMONARY HYPERTENSION (MULTI): ICD-10-CM

## 2025-07-15 DIAGNOSIS — Z98.890 H/O MITRAL VALVE REPAIR: ICD-10-CM

## 2025-07-15 DIAGNOSIS — I48.0 PAROXYSMAL A-FIB (MULTI): ICD-10-CM

## 2025-07-15 DIAGNOSIS — E78.2 MIXED HYPERLIPIDEMIA: ICD-10-CM

## 2025-07-15 DIAGNOSIS — J44.9 CHRONIC OBSTRUCTIVE PULMONARY DISEASE, UNSPECIFIED COPD TYPE (MULTI): ICD-10-CM

## 2025-07-15 DIAGNOSIS — I95.2 HYPOTENSION DUE TO DRUGS: ICD-10-CM

## 2025-07-15 DIAGNOSIS — Z79.01 ANTICOAGULATED: ICD-10-CM

## 2025-07-15 DIAGNOSIS — F17.200 CURRENT SMOKER: ICD-10-CM

## 2025-07-15 PROCEDURE — 99214 OFFICE O/P EST MOD 30 MIN: CPT | Performed by: INTERNAL MEDICINE

## 2025-07-15 PROCEDURE — 93000 ELECTROCARDIOGRAM COMPLETE: CPT | Performed by: INTERNAL MEDICINE

## 2025-07-15 PROCEDURE — 3008F BODY MASS INDEX DOCD: CPT | Performed by: INTERNAL MEDICINE

## 2025-07-15 RX ORDER — SEMAGLUTIDE 0.68 MG/ML
0.25 INJECTION, SOLUTION SUBCUTANEOUS
COMMUNITY
Start: 2025-07-07

## 2025-07-15 NOTE — PROGRESS NOTES
Chief Complaint   Patient presents with    Follow-up     3-4 month Follow up for Atrial Fibrillation         Subjective   Tenisha Nair is a 59 y.o. female     HPI   Patient is here for follow-up continue management for history of atrial fibrillation, episodes of lightheadedness and dizziness and orthostatic hypotension during last office visit, mitral valve valvuloplasty and hyperlipidemia.  Since last time I saw her she reported her symptoms lightheadedness and dizziness completely resolved after we stopped her diltiazem.  She denies any cardiac complaints chest pain, palpitation, lightheadedness, dizziness or syncope.  She reports she quit smoking in April.  She reports that she is scheduled to undergo leg surgery in the near future and requesting preoperative risk assessment and instruction regarding her anticoagulation medication.    Assessment     1.  Symptoms lightheadedness dizziness and orthostatic symptomatology related to low blood pressure resolved since her diltiazem was discontinued  2.  Persistent atrial fibrillation hide her flecainide was switched to sotalol during recent hospitalization she remained in sinus rhythm but today appears to be in junctional rhythm  3.  History of mitral valve valvuloplasty recent echo back in 2024 noted and reviewed with her  4.  Long-term anticoagulation with Xarelto no bleeding  5.  Hyperlipidemia.  No recent lab  6.  She said she quit recently.  Tobacco use and COPD with chronic shortness of breath  7.  Mild to moderate pulmonary hypertension based on her last echocardiogram back in 2024 with PA pressure around 43 mmHg  8.  BMI of 31 with mild weight gain since she quit smoking  9.  Patient is entertaining the idea of proceeding with leg surgery.  Her operative risk is acceptable.  I advised her that she can hold her Xarelto for 3 days prior to surgery     Plan     1.  I reviewed with the patient the result of her recent lab work  2.  I advised her to stop digoxin  3.   "I encouraged her to stay abstinent from smoking  4.  I will see her back in 6 months  5.  Risk, benefits alternative anticoagulation reviewed with her at length she understood and agreed.  6.  I advised the patient she can proceed with her neck surgery and she can hold her Xarelto for 3 days prior to surgery  Review of Systems   All other systems reviewed and are negative.           Vitals:    07/15/25 1253   BP: 98/70   BP Location: Left arm   Patient Position: Sitting   Pulse: 85   Weight: 97.5 kg (215 lb)   Height: 1.753 m (5' 9\")      EKG done in office today    Objective   Physical Exam  Constitutional:       Appearance: Normal appearance.   HENT:      Nose: Nose normal.   Neck:      Vascular: No carotid bruit.   Cardiovascular:      Rate and Rhythm: Normal rate.      Pulses: Normal pulses.      Heart sounds: Normal heart sounds.   Pulmonary:      Effort: Pulmonary effort is normal.   Abdominal:      General: Bowel sounds are normal.      Palpations: Abdomen is soft.   Musculoskeletal:         General: Normal range of motion.      Cervical back: Normal range of motion.      Right lower leg: No edema.      Left lower leg: No edema.   Skin:     General: Skin is warm and dry.   Neurological:      General: No focal deficit present.      Mental Status: She is alert.   Psychiatric:         Mood and Affect: Mood normal.         Behavior: Behavior normal.         Thought Content: Thought content normal.         Judgment: Judgment normal.         Allergies  Ace inhibitors and Moxifloxacin     Current Medications  Current Outpatient Medications   Medication Instructions    buPROPion SR (WELLBUTRIN SR) 150 mg, Every 12 hours    esomeprazole (NexIUM) 40 mg DR capsule 1 capsule, Every 12 hours scheduled (0630,1830)    lamoTRIgine (LAMICTAL) 200 mg, Daily    oxybutynin (Ditropan) 5 mg tablet 1 tablet, 2 times daily    Ozempic 0.25 mg, Every 7 days    paliperidone (INVEGA) 9 mg, Daily    PARoxetine (PAXIL) 40 mg, Daily    " pregabalin (LYRICA) 300 mg, Daily    Qulipta 60 mg, Daily    rivaroxaban (XARELTO) 20 mg, oral, Daily with evening meal    simvastatin (ZOCOR) 40 mg, Nightly    SITagliptin phosphate (JANUVIA) 50 mg, Daily    sotalol (BETAPACE) 80 mg, oral, 2 times daily    theophylline anhydrous (THEOPHYLLINE ORAL) 150 mg, 2 times daily    topiramate (Topamax) 25 mg tablet 3 tablets, 2 times daily                        Assessment/Plan   1. Persistent atrial fibrillation (Multi)        2. Pulmonary hypertension (Multi)  Follow Up In Cardiology      3. Mixed hyperlipidemia        4. Hypotension due to drugs        5. H/O mitral valve repair        6. Anticoagulated        7. BMI 31.0-31.9,adult        8. Chronic obstructive pulmonary disease, unspecified COPD type (Multi)        9. Current smoker        10. Paroxysmal A-fib (Multi)                 Scribe Attestation  By signing my name below, Siomara ROMERO LPN, Scribe   attest that this documentation has been prepared under the direction and in the presence of Anabelle Humphreys MD.     Provider Attestation - Scribe documentation    All medical record entries made by the Scribe were at my direction and personally dictated by me. I have reviewed the chart and agree that the record accurately reflects my personal performance of the history, physical exam, discussion and plan.

## 2025-07-15 NOTE — PATIENT INSTRUCTIONS
Please bring all medicines, vitamins, and herbal supplements with you when you come to the office.    Prescriptions will not be filled unless you are compliant with your follow up appointments or have a follow up appointment scheduled as per instruction of your physician. Refills should be requested at the time of your visit.     Tenisha Nair is clear for surgery from a cardiac standpoint    Stop Lanoxin   6 months

## 2025-07-21 LAB
NON-UH HIE CHOL/HDL RATIO: 4.6
NON-UH HIE CHOLESTEROL: 171 MG/DL (ref 140–200)
NON-UH HIE HDL CHOLESTEROL: 37 MG/DL (ref 23–92)
NON-UH HIE LDL CHOLESTEROL,CALCULATED: 67 MG/DL (ref 0–100)
NON-UH HIE TRIGLYCERIDE W/REFLEX: 334 MG/DL (ref 0–149)
NON-UH HIE VLDL CHOLESTEROL: 66 MG/DL

## 2025-07-24 DIAGNOSIS — I48.19 PERSISTENT ATRIAL FIBRILLATION (MULTI): ICD-10-CM

## 2025-07-24 RX ORDER — SOTALOL HYDROCHLORIDE 80 MG/1
80 TABLET ORAL 2 TIMES DAILY
Qty: 180 TABLET | Refills: 0 | Status: SHIPPED | OUTPATIENT
Start: 2025-07-24

## 2025-08-14 ENCOUNTER — APPOINTMENT (OUTPATIENT)
Dept: CARDIOLOGY | Facility: CLINIC | Age: 60
End: 2025-08-14
Payer: COMMERCIAL

## 2026-02-18 ENCOUNTER — APPOINTMENT (OUTPATIENT)
Dept: CARDIOLOGY | Facility: CLINIC | Age: 61
End: 2026-02-18
Payer: COMMERCIAL

## (undated) DEVICE — UNDERCAST PADDING: Brand: DEROYAL

## (undated) DEVICE — 2000CC GUARDIAN II: Brand: GUARDIAN

## (undated) DEVICE — SUTURE ETHLN SZ 3-0 L18IN NONABSORBABLE BLK PS-2 L19MM 3/8 1669H

## (undated) DEVICE — TUBING PMP L8FT LNG W/ CONN FOR AR-6400 REDEUCE

## (undated) DEVICE — [AGGRESSIVE PLUS CUTTER, ARTHROSCOPIC SHAVER BLADE,  DO NOT RESTERILIZE,  DO NOT USE IF PACKAGE IS DAMAGED,  KEEP DRY,  KEEP AWAY FROM SUNLIGHT]: Brand: FORMULA

## (undated) DEVICE — GLOVE ORANGE PI 7 1/2   MSG9075

## (undated) DEVICE — CHLORAPREP 26ML ORANGE

## (undated) DEVICE — MEDI-VAC NON-CONDUCTIVE SUCTION TUBING: Brand: CARDINAL HEALTH

## (undated) DEVICE — 3M™ STERI-DRAPE™ U-DRAPE 1015: Brand: STERI-DRAPE™

## (undated) DEVICE — GLOVE ORANGE PI 8   MSG9080

## (undated) DEVICE — MAT FLR SURG QUICKWICK 28X54 IN DISP

## (undated) DEVICE — BASIC SINGLE BASIN-LF: Brand: MEDLINE INDUSTRIES, INC.

## (undated) DEVICE — 4.5 MM CANNULA AND OBTURATOR,                                    CONICAL TIP, DISTAL HOLES

## (undated) DEVICE — 3M™ STERI-STRIP™ REINFORCED ADHESIVE SKIN CLOSURES, R1547, 1/2 IN X 4 IN (12 MM X 100 MM), 6 STRIPS/ENVELOPE: Brand: 3M™ STERI-STRIP™

## (undated) DEVICE — KNEE ARTHROSCOPY II-LF: Brand: MEDLINE INDUSTRIES, INC.

## (undated) DEVICE — TUBE IRRIG L8IN LNG PT W/ CONN FOR PMP SYS REDEUCE